# Patient Record
Sex: MALE | Race: BLACK OR AFRICAN AMERICAN
[De-identification: names, ages, dates, MRNs, and addresses within clinical notes are randomized per-mention and may not be internally consistent; named-entity substitution may affect disease eponyms.]

---

## 2020-04-26 ENCOUNTER — MESSAGE (OUTPATIENT)
Age: 45
End: 2020-04-26

## 2022-09-02 ENCOUNTER — EMERGENCY (EMERGENCY)
Facility: HOSPITAL | Age: 47
LOS: 0 days | Discharge: HOME | End: 2022-09-02
Attending: EMERGENCY MEDICINE | Admitting: EMERGENCY MEDICINE

## 2022-09-02 VITALS
OXYGEN SATURATION: 100 % | WEIGHT: 160.06 LBS | SYSTOLIC BLOOD PRESSURE: 138 MMHG | HEIGHT: 69 IN | TEMPERATURE: 98 F | RESPIRATION RATE: 18 BRPM | HEART RATE: 92 BPM | DIASTOLIC BLOOD PRESSURE: 88 MMHG

## 2022-09-02 DIAGNOSIS — Z88.0 ALLERGY STATUS TO PENICILLIN: ICD-10-CM

## 2022-09-02 DIAGNOSIS — M25.521 PAIN IN RIGHT ELBOW: ICD-10-CM

## 2022-09-02 PROCEDURE — 99283 EMERGENCY DEPT VISIT LOW MDM: CPT

## 2022-09-02 PROCEDURE — 73080 X-RAY EXAM OF ELBOW: CPT | Mod: 26,RT

## 2022-09-02 RX ORDER — IBUPROFEN 200 MG
600 TABLET ORAL ONCE
Refills: 0 | Status: COMPLETED | OUTPATIENT
Start: 2022-09-02 | End: 2022-09-02

## 2022-09-02 RX ADMIN — Medication 600 MILLIGRAM(S): at 05:44

## 2022-09-02 NOTE — ED PROVIDER NOTE - PATIENT PORTAL LINK FT
You can access the FollowMyHealth Patient Portal offered by [facility] by registering at the following website: [email or web address]. By joining [email or web address]’s FollowMyHealth portal, you will also be able to view your health information using other applications (apps) compatible with our system.

## 2022-09-02 NOTE — ED ADULT TRIAGE NOTE - CHIEF COMPLAINT QUOTE
Patient complaining of limited ROM to R elbow. Unable to completely extend or flex R elbow. ROM to other joints in arm are normal. Denies trauma to area, no injuries noted

## 2022-09-02 NOTE — ED PROVIDER NOTE - NS ED ROS FT
Constitutional: (-) fever, (-) chills, (-) lethargy  Eyes: (-) eye pain, (-) visual changes, (-) discharge  ENMT: (-) nasal congestion, (-) sore throat. (-) neck pain (-) neck stiffness  Respiratory: (-) cough, (-) SOB, (-) LESTER, (-) respiratory distress  Cardiac: (-) chest pain, (-) palpitations  GI: (-) abdominal pain, (-) nausea, (-) vomiting, (-) diarrhea.  :  (-) dysuria, (-) hematuria, (-) frequency   MS:  (-) back pain, (+) joint pain, R elbow  Neuro:  (-) headache, (-) numbness, (-) focal weakness, (-) tingling   Skin:  (-) rash  Except as documented in the HPI,  all other systems are negative

## 2022-09-02 NOTE — ED PROVIDER NOTE - NSFOLLOWUPINSTRUCTIONS_ED_ALL_ED_FT
Our Emergency Department Referral Coordinators will be reaching out to you in the next 24-48 hours from 9:00am to 5:00pm with a follow up appointment with orthopedics. Please expect a phone call from the hospital in that time frame. If you do not receive a call or if you have any questions or concerns, you can reach them at [phone] or [phone].    Elbow Sprain    WHAT YOU NEED TO KNOW:    An elbow sprain is caused by a stretched or torn ligament in the elbow joint. Ligaments are the strong tissues that connect bones.    DISCHARGE INSTRUCTIONS:    Return to the emergency department if:     The skin of your injured arm looks bluish or pale (less color than normal).  You have new or increased numbness in your injured arm.    Contact your healthcare provider if:     You have increased swelling and pain in your elbow.  You have new or increased stiffness or trouble moving your injured arm.  You have questions or concerns about your condition or care.    Medicines:     Prescription pain medicine may be given. Ask how to take this medicine safely.    Take your medicine as directed. Contact your healthcare provider if you think your medicine is not helping or if you have side effects. Tell him or her if you are allergic to any medicine. Keep a list of the medicines, vitamins, and herbs you take. Include the amounts, and when and why you take them. Bring the list or the pill bottles to follow-up visits. Carry your medicine list with you in case of an emergency.    Rest your elbow: You will need to rest your elbow for 1 to 2 days after your injury. This will help decrease the risk of more damage to your elbow.    Ice your elbow: Apply ice on your elbow for 15 to 20 minutes every hour or as directed. Use an ice pack, or put crushed ice in a plastic bag. Cover it with a towel. Ice helps prevent tissue damage and decreases swelling and pain.    Compress your elbow: Compression provides support and helps decrease swelling and movement so your elbow can heal. You may be told to keep your elbow wrapped with a tight elastic bandage. Follow instructions about how to apply your bandage.    Elevate your elbow: Elevate your elbow above the level of your heart as often as you can. This will help decrease swelling and pain. Prop your elbow on pillows or blankets to keep it elevated comfortably.     Exercise your elbow: You should begin to exercise your arm in a few days, once you are able to move your elbow without pain. Exercises will help decrease stiffness and improve the strength of your arm. Ask your healthcare provider what kind of exercises you should do.    Prevent another elbow sprain:     Make sure you warm up and stretch before you exercise.  Do not exercise when you feel pain or you are tired.  Wear equipment to protect yourself when you play sports.  Stop exercising and playing sports if your symptoms from a past injury return.    Follow up with your healthcare provider within 1 week: Write down any questions you have so you remember to ask them in your follow-up visits.

## 2022-09-02 NOTE — ED PROVIDER NOTE - OBJECTIVE STATEMENT
46 yo M with no PMH presenting for R elbow pain/decreased ROM. 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 Denies any overexertion of activity, trauma to the area, n/w/t, but endorses pain at elbow joint upon complete flexion/extension. Denies other injuries.

## 2022-09-02 NOTE — ED PROVIDER NOTE - PHYSICAL EXAMINATION
CONSTITUTIONAL: well-appearing, in NAD  SKIN: Warm dry, normal skin turgor  HEAD: NCAT  EYES: EOMI, PERRLA, no scleral icterus, conjunctiva pink  ENT: normal pharynx with no erythema or exudates  NECK: Supple; non tender. Full ROM.  CARD: RRR, no murmurs.  RESP: clear to ausculation b/l. No crackles or wheezing.  ABD: soft, non-tender, non-distended, no rebound or guarding.  EXT:  no bony tenderness, no pedal edema, no calf tenderness; unable to completely flex/extend R elbow, radial pulses 2+ b/l; full ROM of shoulder, wrists, and fingers   NEURO: normal motor. normal sensory. Normal gait.  PSYCH: Cooperative, appropriate.

## 2022-09-06 ENCOUNTER — APPOINTMENT (OUTPATIENT)
Dept: ORTHOPEDIC SURGERY | Facility: CLINIC | Age: 47
End: 2022-09-06

## 2023-10-31 ENCOUNTER — EMERGENCY (EMERGENCY)
Facility: HOSPITAL | Age: 48
LOS: 0 days | Discharge: HOME | End: 2023-10-31
Attending: EMERGENCY MEDICINE
Payer: COMMERCIAL

## 2023-10-31 VITALS
WEIGHT: 145.06 LBS | RESPIRATION RATE: 15 BRPM | OXYGEN SATURATION: 98 % | DIASTOLIC BLOOD PRESSURE: 75 MMHG | SYSTOLIC BLOOD PRESSURE: 145 MMHG | TEMPERATURE: 98 F | HEART RATE: 101 BPM

## 2023-10-31 DIAGNOSIS — Z88.0 ALLERGY STATUS TO PENICILLIN: ICD-10-CM

## 2023-10-31 DIAGNOSIS — F17.200 NICOTINE DEPENDENCE, UNSPECIFIED, UNCOMPLICATED: ICD-10-CM

## 2023-10-31 DIAGNOSIS — R21 RASH AND OTHER NONSPECIFIC SKIN ERUPTION: ICD-10-CM

## 2023-10-31 PROCEDURE — 99283 EMERGENCY DEPT VISIT LOW MDM: CPT

## 2023-10-31 PROCEDURE — 99284 EMERGENCY DEPT VISIT MOD MDM: CPT

## 2023-10-31 RX ORDER — DEXAMETHASONE 0.5 MG/5ML
10 ELIXIR ORAL ONCE
Refills: 0 | Status: COMPLETED | OUTPATIENT
Start: 2023-10-31 | End: 2023-10-31

## 2023-10-31 RX ORDER — DEXAMETHASONE 0.5 MG/5ML
10 ELIXIR ORAL ONCE
Refills: 0 | Status: DISCONTINUED | OUTPATIENT
Start: 2023-10-31 | End: 2023-10-31

## 2023-10-31 RX ORDER — HYDROCORTISONE 1 %
1 OINTMENT (GRAM) TOPICAL
Qty: 1 | Refills: 0
Start: 2023-10-31 | End: 2023-11-06

## 2023-10-31 RX ORDER — DIPHENHYDRAMINE HCL 50 MG
50 CAPSULE ORAL ONCE
Refills: 0 | Status: DISCONTINUED | OUTPATIENT
Start: 2023-10-31 | End: 2023-10-31

## 2023-10-31 RX ORDER — DIPHENHYDRAMINE HCL 50 MG
25 CAPSULE ORAL ONCE
Refills: 0 | Status: COMPLETED | OUTPATIENT
Start: 2023-10-31 | End: 2023-10-31

## 2023-10-31 RX ADMIN — Medication 10 MILLIGRAM(S): at 12:36

## 2023-10-31 RX ADMIN — Medication 25 MILLIGRAM(S): at 12:36

## 2023-10-31 NOTE — ED PROVIDER NOTE - PHYSICAL EXAMINATION
CONST: Well appearing in NAD  EYES: PERRL, EOMI, Sclera and conjunctiva clear.   ENT: Oropharynx normal appearing, no erythema or exudates. Uvula midline.  CARD: Normal S1 S2; Normal rate and rhythm  RESP: Equal BS B/L, No wheezes, rhonchi or rales. No distress  GI: Soft, non-tender, non-distended.  MS: Normal ROM in all extremities. No midline spinal tenderness.  SKIN: Area of erythematous, dry skin to R anterior chest. No skin sloughing.   NEURO: A&Ox3, No focal deficits. Strength 5/5 with no sensory deficits. Steady gait

## 2023-10-31 NOTE — ED PROVIDER NOTE - ATTENDING APP SHARED VISIT CONTRIBUTION OF CARE
Right-sided chest wall redness and burning the entire right side chest not in a dermatomal distribution.  Erythema noted.  No hives.  Not tender to touch.  No vesicles.  Oropharynx clear.  No angioedema.  Uvula midline.    Symptomatic treatment.  Steroids.  Steroid cream.  DC with follow-up.

## 2023-10-31 NOTE — ED ADULT NURSE NOTE - NSFALLUNIVINTERV_ED_ALL_ED
Bed/Stretcher in lowest position, wheels locked, appropriate side rails in place/Call bell, personal items and telephone in reach/Instruct patient to call for assistance before getting out of bed/chair/stretcher/Non-slip footwear applied when patient is off stretcher/[place] to call system/Physically safe environment - no spills, clutter or unnecessary equipment/Purposeful proactive rounding/Room/bathroom lighting operational, light cord in reach

## 2023-10-31 NOTE — ED PROVIDER NOTE - PATIENT PORTAL LINK FT
You can access the FollowMyHealth Patient Portal offered by [facility] by registering at the following website: [email or web address]. By joining [employer]’s FollowMyHealth portal, you will also be able to view your health information using other applications (apps) compatible with our system.

## 2023-10-31 NOTE — ED PROVIDER NOTE - OBJECTIVE STATEMENT
47 yo male no pmhx presents complaining of rash. Patient w/ acute onset of erythematous rash to R chest this am. Described as burning sensation. No fever, chills, n/v/d, new detergents, lotions, soaps, SOB, cough

## 2024-05-22 ENCOUNTER — EMERGENCY (EMERGENCY)
Facility: HOSPITAL | Age: 49
LOS: 0 days | Discharge: LEFT BEFORE DISCHARGE | End: 2024-05-23
Payer: COMMERCIAL

## 2024-05-22 VITALS
WEIGHT: 145.06 LBS | HEIGHT: 69 IN | SYSTOLIC BLOOD PRESSURE: 126 MMHG | RESPIRATION RATE: 18 BRPM | DIASTOLIC BLOOD PRESSURE: 90 MMHG | HEART RATE: 95 BPM | OXYGEN SATURATION: 100 % | TEMPERATURE: 98 F

## 2024-05-22 DIAGNOSIS — Z53.21 PROCEDURE AND TREATMENT NOT CARRIED OUT DUE TO PATIENT LEAVING PRIOR TO BEING SEEN BY HEALTH CARE PROVIDER: ICD-10-CM

## 2024-05-22 DIAGNOSIS — R68.83 CHILLS (WITHOUT FEVER): ICD-10-CM

## 2024-05-22 PROCEDURE — L9991: CPT
